# Patient Record
Sex: FEMALE | Race: WHITE | NOT HISPANIC OR LATINO | Employment: FULL TIME | URBAN - METROPOLITAN AREA
[De-identification: names, ages, dates, MRNs, and addresses within clinical notes are randomized per-mention and may not be internally consistent; named-entity substitution may affect disease eponyms.]

---

## 2021-04-12 ENCOUNTER — TELEPHONE (OUTPATIENT)
Dept: OBSTETRICS AND GYNECOLOGY | Facility: CLINIC | Age: 61
End: 2021-04-12

## 2021-04-19 RX ORDER — PHENTERMINE HYDROCHLORIDE 37.5 MG/1
37.5 TABLET ORAL DAILY
COMMUNITY
Start: 2021-03-16 | End: 2022-11-22

## 2021-04-19 RX ORDER — LOSARTAN POTASSIUM AND HYDROCHLOROTHIAZIDE 12.5; 5 MG/1; MG/1
0.5 TABLET ORAL DAILY
COMMUNITY
Start: 2021-04-01

## 2021-04-19 RX ORDER — METFORMIN HYDROCHLORIDE 500 MG/1
500 TABLET, EXTENDED RELEASE ORAL 2 TIMES DAILY
COMMUNITY
Start: 2021-03-16 | End: 2022-11-22

## 2021-04-19 RX ORDER — ESOMEPRAZOLE MAGNESIUM 40 MG/1
CAPSULE, DELAYED RELEASE ORAL
COMMUNITY
Start: 2021-04-01 | End: 2022-11-22

## 2021-04-20 ENCOUNTER — OFFICE VISIT (OUTPATIENT)
Dept: OBSTETRICS AND GYNECOLOGY | Facility: CLINIC | Age: 61
End: 2021-04-20
Payer: COMMERCIAL

## 2021-04-20 VITALS
SYSTOLIC BLOOD PRESSURE: 129 MMHG | DIASTOLIC BLOOD PRESSURE: 80 MMHG | HEIGHT: 64 IN | WEIGHT: 235.63 LBS | BODY MASS INDEX: 40.23 KG/M2 | HEART RATE: 70 BPM

## 2021-04-20 DIAGNOSIS — Z01.419 ENCOUNTER FOR WELL WOMAN EXAM WITH ROUTINE GYNECOLOGICAL EXAM: ICD-10-CM

## 2021-04-20 DIAGNOSIS — N95.1 MENOPAUSAL STATE: ICD-10-CM

## 2021-04-20 DIAGNOSIS — N95.2 ATROPHIC VAGINITIS: ICD-10-CM

## 2021-04-20 DIAGNOSIS — Z12.31 BREAST CANCER SCREENING BY MAMMOGRAM: Primary | ICD-10-CM

## 2021-04-20 PROCEDURE — 99396 PR PREVENTIVE VISIT,EST,40-64: ICD-10-PCS | Mod: S$GLB,,, | Performed by: OBSTETRICS & GYNECOLOGY

## 2021-04-20 PROCEDURE — 99396 PREV VISIT EST AGE 40-64: CPT | Mod: S$GLB,,, | Performed by: OBSTETRICS & GYNECOLOGY

## 2021-04-20 PROCEDURE — 1126F AMNT PAIN NOTED NONE PRSNT: CPT | Mod: S$GLB,,, | Performed by: OBSTETRICS & GYNECOLOGY

## 2021-04-20 PROCEDURE — 1126F PR PAIN SEVERITY QUANTIFIED, NO PAIN PRESENT: ICD-10-PCS | Mod: S$GLB,,, | Performed by: OBSTETRICS & GYNECOLOGY

## 2021-04-20 PROCEDURE — 3008F PR BODY MASS INDEX (BMI) DOCUMENTED: ICD-10-PCS | Mod: CPTII,S$GLB,, | Performed by: OBSTETRICS & GYNECOLOGY

## 2021-04-20 PROCEDURE — 3008F BODY MASS INDEX DOCD: CPT | Mod: CPTII,S$GLB,, | Performed by: OBSTETRICS & GYNECOLOGY

## 2021-04-27 ENCOUNTER — TELEPHONE (OUTPATIENT)
Dept: OBSTETRICS AND GYNECOLOGY | Facility: CLINIC | Age: 61
End: 2021-04-27

## 2022-01-18 ENCOUNTER — TELEPHONE (OUTPATIENT)
Dept: GASTROENTEROLOGY | Facility: CLINIC | Age: 62
End: 2022-01-18
Payer: COMMERCIAL

## 2022-01-18 NOTE — TELEPHONE ENCOUNTER
----- Message from Rebecca Rapp MA sent at 1/7/2022  8:02 AM CST -----  Regarding: OV    ----- Message -----  From: Ernestine Pacheco  Sent: 1/6/2022   2:58 PM CST  To: Luis Fernando [facility] Staff    pt needs call back to rescheduled missed appt..[phone] (home)

## 2022-01-18 NOTE — TELEPHONE ENCOUNTER
----- Message from Rebecca Rapp MA sent at 1/18/2022 10:10 AM CST -----    ----- Message -----  From: Clari Slaughter  Sent: 1/18/2022  10:08 AM CST  To: Luis Fernando MENDO[facility] Staff    Patient returning call  to [facility]. Call back number [phone] (home)

## 2022-01-19 ENCOUNTER — TELEPHONE (OUTPATIENT)
Dept: GASTROENTEROLOGY | Facility: CLINIC | Age: 62
End: 2022-01-19
Payer: COMMERCIAL

## 2022-01-19 NOTE — TELEPHONE ENCOUNTER
----- Message from Rebecca Rapp MA sent at 1/18/2022  1:53 PM CST -----    ----- Message -----  From: Clari Slaughter  Sent: 1/18/2022  11:56 AM CST  To: Luis Fernando MEND[facility] Staff     Patient calling back to speak to Eva . Call back number [phone]. Tks

## 2022-10-12 DIAGNOSIS — K21.9 GERD (GASTROESOPHAGEAL REFLUX DISEASE): Primary | ICD-10-CM

## 2022-10-17 ENCOUNTER — TELEPHONE (OUTPATIENT)
Dept: GASTROENTEROLOGY | Facility: CLINIC | Age: 62
End: 2022-10-17
Payer: COMMERCIAL

## 2022-10-17 NOTE — TELEPHONE ENCOUNTER
6/2021 colon with 2 TA, 1 cecal hyp, repeat in 3y.    Referral for GERD. Okay to make OV with [facility], next available.  [facility]

## 2022-11-21 RX ORDER — PRAZOSIN HYDROCHLORIDE 1 MG/1
CAPSULE ORAL
COMMUNITY
Start: 2022-10-06 | End: 2023-07-05

## 2022-11-21 RX ORDER — SUCRALFATE 1 G/1
TABLET ORAL
COMMUNITY
Start: 2022-10-07 | End: 2022-11-22

## 2022-11-21 RX ORDER — PANTOPRAZOLE SODIUM 40 MG/1
TABLET, DELAYED RELEASE ORAL DAILY
COMMUNITY
Start: 2022-10-06 | End: 2023-07-05 | Stop reason: SDUPTHER

## 2022-11-21 RX ORDER — SERTRALINE HYDROCHLORIDE 25 MG/1
TABLET, FILM COATED ORAL
COMMUNITY
Start: 2022-10-06 | End: 2023-07-05

## 2022-11-22 ENCOUNTER — OFFICE VISIT (OUTPATIENT)
Dept: GASTROENTEROLOGY | Facility: CLINIC | Age: 62
End: 2022-11-22
Payer: COMMERCIAL

## 2022-11-22 VITALS
WEIGHT: 245 LBS | DIASTOLIC BLOOD PRESSURE: 89 MMHG | BODY MASS INDEX: 41.83 KG/M2 | OXYGEN SATURATION: 95 % | HEART RATE: 68 BPM | SYSTOLIC BLOOD PRESSURE: 119 MMHG | HEIGHT: 64 IN

## 2022-11-22 DIAGNOSIS — Z86.010 HISTORY OF COLON POLYPS: ICD-10-CM

## 2022-11-22 DIAGNOSIS — R13.19 ESOPHAGEAL DYSPHAGIA: ICD-10-CM

## 2022-11-22 DIAGNOSIS — K21.9 GASTROESOPHAGEAL REFLUX DISEASE WITHOUT ESOPHAGITIS: Primary | ICD-10-CM

## 2022-11-22 DIAGNOSIS — R14.0 ABDOMINAL BLOATING: ICD-10-CM

## 2022-11-22 PROCEDURE — 1160F PR REVIEW ALL MEDS BY PRESCRIBER/CLIN PHARMACIST DOCUMENTED: ICD-10-PCS | Mod: CPTII,S$GLB,,

## 2022-11-22 PROCEDURE — 3074F PR MOST RECENT SYSTOLIC BLOOD PRESSURE < 130 MM HG: ICD-10-PCS | Mod: CPTII,S$GLB,,

## 2022-11-22 PROCEDURE — 99214 OFFICE O/P EST MOD 30 MIN: CPT | Mod: S$GLB,,,

## 2022-11-22 PROCEDURE — 3074F SYST BP LT 130 MM HG: CPT | Mod: CPTII,S$GLB,,

## 2022-11-22 PROCEDURE — 1159F MED LIST DOCD IN RCRD: CPT | Mod: CPTII,S$GLB,,

## 2022-11-22 PROCEDURE — 3008F PR BODY MASS INDEX (BMI) DOCUMENTED: ICD-10-PCS | Mod: CPTII,S$GLB,,

## 2022-11-22 PROCEDURE — 99214 PR OFFICE/OUTPT VISIT, EST, LEVL IV, 30-39 MIN: ICD-10-PCS | Mod: S$GLB,,,

## 2022-11-22 PROCEDURE — 1159F PR MEDICATION LIST DOCUMENTED IN MEDICAL RECORD: ICD-10-PCS | Mod: CPTII,S$GLB,,

## 2022-11-22 PROCEDURE — 3008F BODY MASS INDEX DOCD: CPT | Mod: CPTII,S$GLB,,

## 2022-11-22 PROCEDURE — 1160F RVW MEDS BY RX/DR IN RCRD: CPT | Mod: CPTII,S$GLB,,

## 2022-11-22 PROCEDURE — 3079F DIAST BP 80-89 MM HG: CPT | Mod: CPTII,S$GLB,,

## 2022-11-22 PROCEDURE — 3079F PR MOST RECENT DIASTOLIC BLOOD PRESSURE 80-89 MM HG: ICD-10-PCS | Mod: CPTII,S$GLB,,

## 2022-11-22 RX ORDER — SUCRALFATE 1 G/10ML
1 SUSPENSION ORAL
Qty: 414 ML | Refills: 0 | Status: SHIPPED | OUTPATIENT
Start: 2022-11-22 | End: 2023-05-29

## 2022-11-22 NOTE — PATIENT INSTRUCTIONS
Final Anesthesia Post-op Assessment    Patient: Mona Rosado  Procedure(s) Performed: HYSTEROSCOPY, DIAGNOSTICPOSSIBLE MYOMECTOMY, UTERUS, HYSTEROSCOPIC - POSSIBLE POLYPECTOMY, ENDOMETRIUM  Anesthesia type: General    Vitals Value Taken Time   Temp 36.7 °C (98 °F) 11/26/2019 11:35 AM   Pulse 68 11/26/2019 11:50 AM   Resp 10 11/26/2019 11:50 AM   SpO2 100 % 11/26/2019 11:50 AM   /63 11/26/2019 11:50 AM       Last 24 I/O:     Intake/Output Summary (Last 24 hours) at 11/26/2019 1159  Last data filed at 11/26/2019 1104  Gross per 24 hour   Intake 800 ml   Output --   Net 800 ml         Patient Location: Phase II  Post-op Vital Signs:stable  Level of Consciousness: participates in exam, answers questions appropriately, alert and awake  Respiratory Status: spontaneous ventilation  Cardiovascular blood pressure returned to baseline  Hydration: euvolemic  Pain Management: well controlled  Handoff: Handoff to receiving nurse was performed and questions were answered  Nausea: None  Airway Patency:patent  Post-op Assessment: awake, alert, appropriately conversant, or baseline, no complications, patient tolerated procedure well with no complications and evidence of recall       Schedule upper endoscopy with Dr. Gould.    Please notify my office if you have not been contacted within two weeks after any procedures, submitting any samples (biopsies, blood, stool, urine, etc.) or after any imaging (X-ray, CT, MRI, etc.).

## 2022-11-22 NOTE — PROGRESS NOTES
Clinic Note    Reason for visit:  The primary encounter diagnosis was Gastroesophageal reflux disease without esophagitis. Diagnoses of Esophageal dysphagia, Abdominal bloating, History of colon polyps, and BMI 40.0-44.9, adult were also pertinent to this visit.    PCP: Odalis Delarosa   [address] / [place] [address]    HPI:  This is a 62 y.o. female who is established with Dr. Gould. Patient reports intermittent esophageal dysphagia with solids/liquids for many years. Patient also reports chronic reflux. She was taking esomeprazole 40 mg daily and was having to supplement with OTC antacids as well so this was switched to pantoprazole 40 daily in 10/2022 and she states it seems to help. She was given Carafate tablets and she states it actually made her stomach feel worse but the liquid has helped her in the past. She states her son passed away earlier this year so that has caused a lot of stress. She denies blood in stool or dark stools. She denies NSAID use. She also reports intermittent abdominal bloating and wonders if she is intolerant to gluten.     6/2021 colon with 2 TA, 1 cecal hyp, repeat in 3y.    EGD/Colonoscopy with Dr. Dixon 4/18/2017: foreshortened esophagus, small HH, colon polyps- benign    Review of Systems   Constitutional:  Negative for chills, diaphoresis, fatigue, fever and unexpected weight change.   HENT:  Positive for postnasal drip and trouble swallowing. Negative for mouth sores, nosebleeds, sore throat and voice change.    Eyes:  Negative for pain, discharge and eye dryness.   Respiratory:  Positive for choking. Negative for apnea, cough, chest tightness, shortness of breath and wheezing.    Cardiovascular:  Positive for chest pain. Negative for palpitations, leg swelling and claudication.   Gastrointestinal:  Positive for abdominal distention and reflux. Negative for abdominal pain, anal bleeding, blood in stool, change in bowel habit, constipation, diarrhea,  nausea, rectal pain, vomiting, fecal incontinence and change in bowel habit.   Genitourinary:  Positive for bladder incontinence. Negative for difficulty urinating, dysuria, flank pain, frequency and hematuria.   Musculoskeletal:  Positive for joint swelling. Negative for arthralgias, back pain and joint deformity.   Integumentary:  Negative for color change, rash and wound.   Allergic/Immunologic: Negative for environmental allergies and food allergies.   Neurological:  Negative for seizures, facial asymmetry, speech difficulty, weakness, headaches and memory loss.   Hematological:  Negative for adenopathy. Does not bruise/bleed easily.   Psychiatric/Behavioral:  Negative for agitation, behavioral problems, confusion, hallucinations and sleep disturbance.       Past Medical History:   Diagnosis Date    BMI 40.0-44.9, adult     Colon polyp     Elevated glucose     GERD (gastroesophageal reflux disease)     HTN (hypertension)     LULA (stress urinary incontinence, female)      Past Surgical History:   Procedure Laterality Date     SECTION       SECTION       SECTION      CHOLECYSTECTOMY      COLONOSCOPY      ENDOSCOPY N/A     HYSTERECTOMY      for fibroids and one ovary removed for cysts    TONSILLECTOMY AND ADENOIDECTOMY      TOTAL KNEE ARTHROPLASTY Right      Family History   Problem Relation Age of Onset    Pancreatic cancer Mother 68    Coronary artery disease Father     Endometrial cancer Sister 52    Breast cancer Maternal Aunt     Esophageal cancer Paternal Uncle     Stomach cancer Maternal Grandmother     Liver cancer Neg Hx     Liver disease Neg Hx     Colon cancer Neg Hx     Crohn's disease Neg Hx     Ulcerative colitis Neg Hx      Social History     Tobacco Use    Smoking status: Never    Smokeless tobacco: Never   Substance Use Topics    Alcohol use: Never    Drug use: Never     Review of patient's allergies indicates:  No Known Allergies   Medication List with Changes/Refills  "  New Medications    SUCRALFATE (CARAFATE) 100 MG/ML SUSPENSION    Take 10 mLs (1 g total) by mouth 4 (four) times daily before meals and nightly.   Current Medications    LOSARTAN-HYDROCHLOROTHIAZIDE 50-12.5 MG (HYZAAR) 50-12.5 MG PER TABLET    Take 0.5 tablets by mouth once daily.    PANTOPRAZOLE (PROTONIX) 40 MG TABLET    once daily.    PRASTERONE, DHEA, (INTRAROSA) 6.5 MG INST    Place 6.5 mg vaginally every evening.    PRAZOSIN (MINIPRESS) 1 MG CAP        SERTRALINE (ZOLOFT) 25 MG TABLET       Discontinued Medications    ESOMEPRAZOLE (NEXIUM) 40 MG CAPSULE        METFORMIN (GLUCOPHAGE-XR) 500 MG ER 24HR TABLET    Take 500 mg by mouth 2 (two) times daily.    PHENTERMINE (ADIPEX-P) 37.5 MG TABLET    Take 37.5 mg by mouth once daily.    SUCRALFATE (CARAFATE) 1 GRAM TABLET             Vital Signs:  /89   Pulse 68   Ht 5' 4" (1.626 m)   Wt 111.1 kg (245 lb)   SpO2 95%   BMI 42.05 kg/m²        Physical Exam  Vitals reviewed.   Constitutional:       General: She is awake. She is not in acute distress.     Appearance: Normal appearance. She is well-developed. She is obese. She is not ill-appearing, toxic-appearing or diaphoretic.   HENT:      Head: Normocephalic and atraumatic.      Nose: Nose normal.      Mouth/Throat:      Mouth: Mucous membranes are moist.      Pharynx: Oropharynx is clear. No oropharyngeal exudate or posterior oropharyngeal erythema.   Eyes:      General: Lids are normal. Gaze aligned appropriately. No scleral icterus.        Right eye: No discharge.         Left eye: No discharge.      Extraocular Movements: Extraocular movements intact.      Conjunctiva/sclera: Conjunctivae normal.   Neck:      Trachea: Trachea normal.   Cardiovascular:      Rate and Rhythm: Normal rate and regular rhythm.      Pulses:           Radial pulses are 2+ on the right side and 2+ on the left side.   Pulmonary:      Effort: Pulmonary effort is normal. No respiratory distress.      Breath sounds: Normal breath " sounds. No stridor. No wheezing or rhonchi.   Chest:      Chest wall: No tenderness.   Abdominal:      General: Bowel sounds are normal. There is no distension.      Palpations: Abdomen is soft. There is no fluid wave, hepatomegaly or mass.      Tenderness: There is no abdominal tenderness. There is no guarding or rebound.   Musculoskeletal:         General: No tenderness or deformity.      Cervical back: Full passive range of motion without pain and neck supple. No tenderness.      Right lower leg: No edema.      Left lower leg: No edema.   Lymphadenopathy:      Cervical: No cervical adenopathy.   Skin:     General: Skin is warm and dry.      Capillary Refill: Capillary refill takes less than 2 seconds.      Coloration: Skin is not cyanotic, jaundiced or pale.      Findings: No rash.   Neurological:      General: No focal deficit present.      Mental Status: She is alert and oriented to person, place, and time.      Cranial Nerves: No facial asymmetry.      Motor: No tremor.   Psychiatric:         Attention and Perception: Attention normal.         Mood and Affect: Mood and affect normal.         Speech: Speech normal.         Behavior: Behavior normal. Behavior is cooperative.          All of the data above and below has been reviewed by myself and any further interpretations will be reflected in the assessment and plan.   The data includes review of external notes, and independent interpretation of lab results, procedures, x-rays, and imaging reports.      Assessment:  Gastroesophageal reflux disease without esophagitis  -     Ambulatory Referral to External Surgery  -     Ambulatory referral/consult to Gastroenterology  -     sucralfate (CARAFATE) 100 mg/mL suspension; Take 10 mLs (1 g total) by mouth 4 (four) times daily before meals and nightly.  Dispense: 414 mL; Refill: 0    Esophageal dysphagia  -     Ambulatory Referral to External Surgery    Abdominal bloating    History of colon polyps  Comments:  Due  6/2024    BMI 40.0-44.9, adult    GERD v dysmotility v EE v H. Pylori v ulcer v Celiac. Plan for EGD with E/G/D biopsies.   Consider EM if EGD unrevealing as source of dysphagia. Discussed with patient.      Recommendations:  Schedule upper endoscopy with Dr. Gould at [facility].   May take Carafate as needed.   Continue taking pantoprazole 40 mg daily.     Risks, benefits, and alternatives of medical management, any associated procedures, and/or treatment discussed with the patient. Patient given opportunity to ask questions and voices understanding. Patient has elected to proceed with the recommended care modalities as discussed.    Follow up with Dr. Gould.     Order summary:  Orders Placed This Encounter   Procedures    Ambulatory Referral to External Surgery          Instructed patient to notify my office if they have not been contacted within two weeks after any procedures, submitting any samples (biopsies, blood, stool, urine, etc.) or after any imaging (X-ray, CT, MRI, etc.).      Mita Matos NP    This document may have been created using a voice recognition transcribing system. Incorrect words or phrases may have been missed during proofreading. Please interpret accordingly or contact me for clarification.

## 2022-11-28 ENCOUNTER — TELEPHONE (OUTPATIENT)
Dept: GASTROENTEROLOGY | Facility: CLINIC | Age: 62
End: 2022-11-28
Payer: COMMERCIAL

## 2022-11-28 DIAGNOSIS — R13.19 ESOPHAGEAL DYSPHAGIA: ICD-10-CM

## 2022-11-28 DIAGNOSIS — K21.9 GASTROESOPHAGEAL REFLUX DISEASE WITHOUT ESOPHAGITIS: Primary | ICD-10-CM

## 2022-11-28 NOTE — TELEPHONE ENCOUNTER
"[facility]  [address] [place] [address]  Phone: [phone]  Fax: [phone]    History & Physical         Provider: Dr. Maricruz Gould    Patient Name: Tammi VARGAS (age):1960  62 y.o.           Gender: female   Phone: [phone]     Referring Physician: Odalis Delarosa     Vital Signs:   Height - 5' 4"  Weight - 245 lbs  BMI -  42.02    Plan: EGD    Encounter Diagnoses   Name Primary?    Gastroesophageal reflux disease without esophagitis Yes    Esophageal dysphagia            History:      Past Medical History:   Diagnosis Date    BMI 40.0-44.9, adult     Colon polyp     Elevated glucose     GERD (gastroesophageal reflux disease)     HTN (hypertension)     LULA (stress urinary incontinence, female)       Past Surgical History:   Procedure Laterality Date     SECTION       SECTION       SECTION      CHOLECYSTECTOMY      COLONOSCOPY      ENDOSCOPY N/A     HYSTERECTOMY      for fibroids and one ovary removed for cysts    TONSILLECTOMY AND ADENOIDECTOMY      TOTAL KNEE ARTHROPLASTY Right       Medication List with Changes/Refills   Current Medications    LOSARTAN-HYDROCHLOROTHIAZIDE 50-12.5 MG (HYZAAR) 50-12.5 MG PER TABLET    Take 0.5 tablets by mouth once daily.    PANTOPRAZOLE (PROTONIX) 40 MG TABLET    once daily.    PRASTERONE, DHEA, (INTRAROSA) 6.5 MG INST    Place 6.5 mg vaginally every evening.    PRAZOSIN (MINIPRESS) 1 MG CAP        SERTRALINE (ZOLOFT) 25 MG TABLET        SUCRALFATE (CARAFATE) 100 MG/ML SUSPENSION    Take 10 mLs (1 g total) by mouth 4 (four) times daily before meals and nightly.      Review of patient's allergies indicates:  No Known Allergies   Family History   Problem Relation Age of Onset    Pancreatic cancer Mother 68    Coronary artery disease Father     Endometrial cancer Sister 52    Breast cancer Maternal Aunt     Esophageal " cancer Paternal Uncle     Stomach cancer Maternal Grandmother     Liver cancer Neg Hx     Liver disease Neg Hx     Colon cancer Neg Hx     Crohn's disease Neg Hx     Ulcerative colitis Neg Hx       Social History     Tobacco Use    Smoking status: Never    Smokeless tobacco: Never   Substance Use Topics    Alcohol use: Never    Drug use: Never        Physical Examination:     General Appearance:___________________________  HEENT: _____________________________________  Abdomen:____________________________________  Heart:________________________________________  Lungs:_______________________________________  Extremities:___________________________________  Skin:_________________________________________  Endocrine:____________________________________  Genitourinary:_________________________________  Neurological:__________________________________      Patient has been evaluated immediately prior to sedation and is medically cleared for endoscopy with IVCS as an ASA class: ______      Physician Signature: _________________________       Date: ________  Time: ________

## 2023-07-03 NOTE — PROGRESS NOTES
Clinic Note    Reason for visit:  The primary encounter diagnosis was Esophageal dysphagia. Diagnoses of Gastroesophageal reflux disease without esophagitis, History of colon polyps, and Generalized abdominal pain were also pertinent to this visit.    PCP: Odalis Delarosa       HPI:  This is a 63 y.o. female here for follow up of GERD, dysphagia. EGD ordered last visit- pt cancelled.  She was on Nexium in about a year and a half ago transitioned to pantoprazole 40 mg daily.  Without it she will get near daily symptoms of her intermittent dysphagia to solids and liquids.  With it she gets symptoms about once or twice a week.  She has to eat her last meal before 1900.  Otherwise she may have to sleep in a recliner.  She had a prior upper endoscopy but no known prior esophageal biopsies.  General abdominal pain at times, cramping, not related to eating or BM. Seems more lower. Asks about AAA eval.    6/2021 colon with 2 TA, 1 cecal hyp, repeat in 3y.    EGD/Colonoscopy with Dr. Dixon 4/18/2017: foreshortened esophagus, small HH, colon polyps- benign    GERD v dysmotility v EE v H. Pylori v ulcer v Celiac. Plan for EGD with E/G/D biopsies.   Consider EM if EGD unrevealing as source of dysphagia. Discussed with patient.      Review of Systems   Constitutional:  Negative for fatigue, fever and unexpected weight change.   HENT:  Positive for postnasal drip. Negative for mouth sores, sore throat and trouble swallowing.    Eyes:  Negative for pain, discharge and eye dryness.   Respiratory:  Negative for apnea, cough, choking, chest tightness, shortness of breath and wheezing.    Cardiovascular:  Negative for chest pain, palpitations and leg swelling.   Gastrointestinal:  Positive for nausea and reflux. Negative for abdominal distention, abdominal pain, anal bleeding, blood in stool, change in bowel habit, constipation, diarrhea, rectal pain, vomiting, fecal incontinence and change in bowel habit.   Genitourinary:   Positive for bladder incontinence. Negative for dysuria and hematuria.   Musculoskeletal:  Positive for joint swelling. Negative for arthralgias and back pain.   Integumentary:  Negative for color change and rash.   Allergic/Immunologic: Negative for environmental allergies and food allergies.   Neurological:  Negative for seizures and headaches.   Hematological:  Negative for adenopathy. Does not bruise/bleed easily.      Past Medical History:   Diagnosis Date    BMI 40.0-44.9, adult     Colon polyp     Elevated glucose     GERD (gastroesophageal reflux disease)     HTN (hypertension)     LULA (stress urinary incontinence, female)      Past Surgical History:   Procedure Laterality Date     SECTION       SECTION       SECTION      CHOLECYSTECTOMY      COLONOSCOPY      ENDOSCOPY N/A     HYSTERECTOMY      for fibroids and one ovary removed for cysts    TONSILLECTOMY AND ADENOIDECTOMY      TOTAL KNEE ARTHROPLASTY Right      Family History   Problem Relation Age of Onset    Pancreatic cancer Mother 68    Coronary artery disease Father     Endometrial cancer Sister 52    Breast cancer Maternal Aunt     Esophageal cancer Paternal Uncle     Stomach cancer Maternal Grandmother     Liver cancer Neg Hx     Liver disease Neg Hx     Colon cancer Neg Hx     Crohn's disease Neg Hx     Ulcerative colitis Neg Hx      Social History     Tobacco Use    Smoking status: Never    Smokeless tobacco: Never   Substance Use Topics    Alcohol use: Never    Drug use: Never     Review of patient's allergies indicates:  No Known Allergies     Medication List with Changes/Refills   New Medications    SUCRALFATE (CARAFATE) 1 GRAM TABLET    Take 1 tablet (1 g total) by mouth 4 (four) times daily.   Current Medications    LOSARTAN-HYDROCHLOROTHIAZIDE 50-12.5 MG (HYZAAR) 50-12.5 MG PER TABLET    Take 0.5 tablets by mouth once daily.   Changed and/or Refilled Medications    Modified Medication Previous Medication     "PANTOPRAZOLE (PROTONIX) 40 MG TABLET pantoprazole (PROTONIX) 40 MG tablet       Take 1 tablet (40 mg total) by mouth once daily.    once daily.   Discontinued Medications    PRASTERONE, DHEA, (INTRAROSA) 6.5 MG INST    Place 6.5 mg vaginally every evening.    PRAZOSIN (MINIPRESS) 1 MG CAP        SERTRALINE (ZOLOFT) 25 MG TABLET        SUCRALFATE (CARAFATE) 100 MG/ML SUSPENSION    TAKE 10 MLS BY MOUTH 4 TIMES DAILY BEFORE MEALS AND NIGHTLY.         Vital Signs:  /74   Pulse 75   Ht 5' 5" (1.651 m)   Wt 101.6 kg (224 lb)   SpO2 (!) 94%   BMI 37.28 kg/m²         Physical Exam  Vitals reviewed.   Constitutional:       General: She is awake. She is not in acute distress.     Appearance: Normal appearance. She is well-developed. She is not ill-appearing, toxic-appearing or diaphoretic.   HENT:      Head: Normocephalic and atraumatic.      Nose: Nose normal.      Mouth/Throat:      Mouth: Mucous membranes are moist.      Pharynx: Oropharynx is clear. No oropharyngeal exudate or posterior oropharyngeal erythema.   Eyes:      General: Lids are normal. Gaze aligned appropriately. No scleral icterus.        Right eye: No discharge.         Left eye: No discharge.      Conjunctiva/sclera: Conjunctivae normal.   Neck:      Trachea: Trachea normal.   Cardiovascular:      Rate and Rhythm: Normal rate and regular rhythm.      Pulses:           Radial pulses are 2+ on the right side and 2+ on the left side.   Pulmonary:      Effort: Pulmonary effort is normal. No respiratory distress.      Breath sounds: No stridor. No wheezing.   Chest:      Chest wall: No tenderness.   Abdominal:      General: Bowel sounds are normal. There is no distension.      Palpations: Abdomen is soft. There is no fluid wave, hepatomegaly or mass.      Tenderness: There is no abdominal tenderness. There is no guarding or rebound.   Musculoskeletal:         General: No tenderness or deformity.      Cervical back: Full passive range of motion " without pain and neck supple. No tenderness.      Right lower leg: No edema.      Left lower leg: No edema.   Lymphadenopathy:      Cervical: No cervical adenopathy.   Skin:     General: Skin is warm and dry.      Capillary Refill: Capillary refill takes less than 2 seconds.      Coloration: Skin is not cyanotic, jaundiced or pale.   Neurological:      General: No focal deficit present.      Mental Status: She is alert and oriented to person, place, and time.      Motor: No tremor.   Psychiatric:         Attention and Perception: Attention normal.         Mood and Affect: Mood and affect normal.         Speech: Speech normal.         Behavior: Behavior normal. Behavior is cooperative.          All of the data above and below has been reviewed by myself and any further interpretations will be reflected in the assessment and plan.   The data includes review of external notes, and independent interpretation of lab results, procedures, x-rays, and imaging reports.      Assessment:  Esophageal dysphagia  -     sucralfate (CARAFATE) 1 gram tablet; Take 1 tablet (1 g total) by mouth 4 (four) times daily.  Dispense: 300 tablet; Refill: 1  -     pantoprazole (PROTONIX) 40 MG tablet; Take 1 tablet (40 mg total) by mouth once daily.  Dispense: 90 tablet; Refill: 4    Gastroesophageal reflux disease without esophagitis  -     sucralfate (CARAFATE) 1 gram tablet; Take 1 tablet (1 g total) by mouth 4 (four) times daily.  Dispense: 300 tablet; Refill: 1  -     pantoprazole (PROTONIX) 40 MG tablet; Take 1 tablet (40 mg total) by mouth once daily.  Dispense: 90 tablet; Refill: 4    History of colon polyps    Generalized abdominal pain  -     US Abdomen Complete; Future; Expected date: 07/05/2023    Dysphagia seems directly related to her acid reflux episodes.  She already practices therapeutic lifestyle changes.  She is on the pantoprazole 40 mg daily with improvement but not complete control of her symptoms.  Plan for upper  endoscopy with EGD biopsies.  If this is unrevealing then recommend esophageal manometry (reviewed with the patient in clinic).  The liquid Carafate works better for her than the tablets.  She requests a refill.     Recommendations:  Schedule upper endoscopy.    Schedule abdominal ultrasound.    You may continue your pantoprazole 40 mg daily.  May take Carafate as needed.  May dissolve a Carafate tablet in 2 tsp of water if you prefer the liquid version.    Please notify my office if you have not been contacted within two weeks after any procedures, submitting any samples (biopsies, blood, stool, urine, etc.) or after any imaging (X-ray, CT, MRI, etc.).     Risks, benefits, and alternatives of medical management, any associated procedures, and/or treatment discussed with the patient. Patient given opportunity to ask questions and voices understanding. Patient has elected to proceed with the recommended care modalities as discussed.    Instructed patient to notify my office if they have not been contacted within two weeks after any procedures, submitting any samples (biopsies, blood, stool, urine, etc.) or after any imaging (X-ray, CT, MRI, etc.).     Follow up in about 1 year (around 7/5/2024).    Order summary:  Orders Placed This Encounter   Procedures    US Abdomen Complete          This document may have been created using a voice recognition transcribing system. Incorrect words or phrases may have been missed during proofreading. Please interpret accordingly or contact me for clarification.     Maricruz Gould MD

## 2023-07-05 ENCOUNTER — OFFICE VISIT (OUTPATIENT)
Dept: GASTROENTEROLOGY | Facility: CLINIC | Age: 63
End: 2023-07-05
Payer: MEDICARE

## 2023-07-05 ENCOUNTER — TELEPHONE (OUTPATIENT)
Dept: GASTROENTEROLOGY | Facility: CLINIC | Age: 63
End: 2023-07-05

## 2023-07-05 VITALS
HEIGHT: 65 IN | HEART RATE: 75 BPM | BODY MASS INDEX: 37.32 KG/M2 | WEIGHT: 224 LBS | SYSTOLIC BLOOD PRESSURE: 103 MMHG | OXYGEN SATURATION: 94 % | DIASTOLIC BLOOD PRESSURE: 74 MMHG

## 2023-07-05 DIAGNOSIS — Z86.010 HISTORY OF COLON POLYPS: ICD-10-CM

## 2023-07-05 DIAGNOSIS — R10.84 GENERALIZED ABDOMINAL PAIN: ICD-10-CM

## 2023-07-05 DIAGNOSIS — K21.9 GASTROESOPHAGEAL REFLUX DISEASE WITHOUT ESOPHAGITIS: ICD-10-CM

## 2023-07-05 DIAGNOSIS — R13.19 ESOPHAGEAL DYSPHAGIA: Primary | ICD-10-CM

## 2023-07-05 PROCEDURE — 99215 OFFICE O/P EST HI 40 MIN: CPT | Mod: S$GLB,,, | Performed by: INTERNAL MEDICINE

## 2023-07-05 PROCEDURE — 99215 PR OFFICE/OUTPT VISIT, EST, LEVL V, 40-54 MIN: ICD-10-PCS | Mod: S$GLB,,, | Performed by: INTERNAL MEDICINE

## 2023-07-05 RX ORDER — SUCRALFATE 1 G/1
1 TABLET ORAL 4 TIMES DAILY
Qty: 300 TABLET | Refills: 1 | Status: SHIPPED | OUTPATIENT
Start: 2023-07-05 | End: 2023-12-02

## 2023-07-05 RX ORDER — PANTOPRAZOLE SODIUM 40 MG/1
40 TABLET, DELAYED RELEASE ORAL DAILY
Qty: 90 TABLET | Refills: 4 | Status: SHIPPED | OUTPATIENT
Start: 2023-07-05

## 2023-07-05 NOTE — LETTER
July 5, 2023        Odalis Delarosa MD  [address]  [address]  [place] [address]             [place] [facility]  [address] [address]  Phone: [phone]  Fax: [phone]   Patient: Tammi Horton   MR Number: [id number]   YOB: 1960   Date of Visit: 7/5/2023       Dear Dr. Delarosa:    Thank you for referring Tammi Horton to me for evaluation. Attached you will find relevant portions of my assessment and plan of care.    If you have questions, please do not hesitate to call me. I look forward to following Tammi Horton along with you.    Sincerely,      Maricruz Gould MD            CC  No Recipients    Enclosure

## 2023-07-05 NOTE — PATIENT INSTRUCTIONS
Schedule upper endoscopy.    Schedule abdominal ultrasound.    You may continue your pantoprazole 40 mg daily.  May take Carafate as needed.  May dissolve a Carafate tablet in 2 tsp of water if you prefer the liquid version.    Please notify my office if you have not been contacted within two weeks after any procedures, submitting any samples (biopsies, blood, stool, urine, etc.) or after any imaging (X-ray, CT, MRI, etc.).

## 2023-07-17 ENCOUNTER — TELEPHONE (OUTPATIENT)
Dept: GASTROENTEROLOGY | Facility: CLINIC | Age: 63
End: 2023-07-17
Payer: MEDICARE

## 2023-07-18 NOTE — TELEPHONE ENCOUNTER
Abd US: liver size ULN, fatty liver, PV patent, no GB, no manuel dil.    Notify patient that her abdominal US looks well. Some signs of fatty liver. Send copy or report to PCP as JOVITA HUI

## 2023-08-24 ENCOUNTER — TELEPHONE (OUTPATIENT)
Dept: GASTROENTEROLOGY | Facility: CLINIC | Age: 63
End: 2023-08-24
Payer: MEDICARE

## 2023-08-24 VITALS — WEIGHT: 224 LBS | HEIGHT: 65 IN | BODY MASS INDEX: 37.32 KG/M2

## 2023-08-24 DIAGNOSIS — K21.9 GASTROESOPHAGEAL REFLUX DISEASE WITHOUT ESOPHAGITIS: Primary | ICD-10-CM

## 2023-08-24 DIAGNOSIS — R13.19 ESOPHAGEAL DYSPHAGIA: ICD-10-CM

## 2023-08-24 NOTE — TELEPHONE ENCOUNTER
"[place] [facility]  [address] [place] [address]  Phone: [phone]  Fax: [phone]    History & Physical         Provider: Dr. Maricruz Gould    Patient Name: Tammi PRESSLEY Statum                                                                       (age):1960  63 y.o.           Gender: female   Phone: [phone]     Referring Physician: Odalis Delarosa     Vital Signs:   Height - 5 ' 5"  Weight - 224 lb  BMI -  37.28    Plan: EGD @ [facility] [place]    Encounter Diagnoses   Name Primary?    Gastroesophageal reflux disease without esophagitis Yes    Esophageal dysphagia            History:      Past Medical History:   Diagnosis Date    BMI 40.0-44.9, adult     Colon polyp     Elevated glucose     GERD (gastroesophageal reflux disease)     HTN (hypertension)     LULA (stress urinary incontinence, female)       Past Surgical History:   Procedure Laterality Date     SECTION       SECTION       SECTION      CHOLECYSTECTOMY      COLONOSCOPY      ENDOSCOPY N/A     HYSTERECTOMY      for fibroids and one ovary removed for cysts    TONSILLECTOMY AND ADENOIDECTOMY      TOTAL KNEE ARTHROPLASTY Right       Medication List with Changes/Refills   Current Medications    LOSARTAN-HYDROCHLOROTHIAZIDE 50-12.5 MG (HYZAAR) 50-12.5 MG PER TABLET    Take 0.5 tablets by mouth once daily.    PANTOPRAZOLE (PROTONIX) 40 MG TABLET    Take 1 tablet (40 mg total) by mouth once daily.    SUCRALFATE (CARAFATE) 1 GRAM TABLET    Take 1 tablet (1 g total) by mouth 4 (four) times daily.      Review of patient's allergies indicates:  No Known Allergies   Family History   Problem Relation Age of Onset    Pancreatic cancer Mother 68    Coronary artery disease Father     Endometrial cancer Sister 52    Breast cancer Maternal Aunt     Esophageal cancer Paternal Uncle     Stomach cancer Maternal Grandmother     Liver cancer Neg Hx     Liver disease Neg Hx     Colon cancer Neg Hx     Crohn's " disease Neg Hx     Ulcerative colitis Neg Hx       Social History     Tobacco Use    Smoking status: Never    Smokeless tobacco: Never   Substance Use Topics    Alcohol use: Never    Drug use: Never        Physical Examination:     General Appearance:___________________________  HEENT: _____________________________________  Abdomen:____________________________________  Heart:________________________________________  Lungs:_______________________________________  Extremities:___________________________________  Skin:_________________________________________  Endocrine:____________________________________  Genitourinary:_________________________________  Neurological:__________________________________      Patient has been evaluated immediately prior to sedation and is medically cleared for endoscopy with IVCS as an ASA class: ______      Physician Signature: _________________________       Date: ________  Time: ________

## 2023-08-30 ENCOUNTER — OUTSIDE PLACE OF SERVICE (OUTPATIENT)
Dept: GASTROENTEROLOGY | Facility: CLINIC | Age: 63
End: 2023-08-30

## 2023-08-30 PROCEDURE — 43239 EGD BIOPSY SINGLE/MULTIPLE: CPT | Mod: ,,, | Performed by: INTERNAL MEDICINE

## 2023-08-30 PROCEDURE — 43239 PR EGD, FLEX, W/BIOPSY, SGL/MULTI: ICD-10-PCS | Mod: ,,, | Performed by: INTERNAL MEDICINE

## 2023-09-05 ENCOUNTER — TELEPHONE (OUTPATIENT)
Dept: GASTROENTEROLOGY | Facility: CLINIC | Age: 63
End: 2023-09-05
Payer: MEDICARE

## 2024-07-08 NOTE — PROGRESS NOTES
Clinic Note    Reason for visit:  The primary encounter diagnosis was Gastroesophageal reflux disease without esophagitis. Diagnoses of Epigastric pain, Esophageal dysphagia, Family history of pancreatic cancer, and History of colon polyps were also pertinent to this visit.    PCP: Odalis Delarosa       HPI:  This is a 64 y.o. female here for follow up. Patient with GERD, dysphagia. On panto 40mg daily. She states she she still will have episodes of reflux/heartburn with inciting foods and eating late. She states she has upper abdominal pain after meals. Describes as an ache. Pain improves with Carafate. She reports intermittent dysphagia mainly with pills. May occur twice a month. She is having constipation. Having BM every 3 days. May have loose stool when she does have BM. Has seen [facility] w/ BM attributed to hemorrhoids.  Mother had pancreatic cancer. Interested in pancreatic cancer evaluation.    EGD 8/31/2023 DBx nl, GBx react w/o Hp, EGBx reflux, EBx reflux.     Abd US 4/23/2024: liver size ULN, fatty liver, PV patent, no GB, no manuel dil.     6/2021 colon with 2 TA, 1 cecal hyp, repeat in 3y.     Review of Systems   Constitutional:  Negative for fatigue, fever and unexpected weight change.   HENT:  Negative for mouth sores, postnasal drip, sore throat and trouble swallowing.    Eyes:  Negative for pain, discharge and eye dryness.   Respiratory:  Negative for apnea, cough, choking, chest tightness, shortness of breath and wheezing.    Cardiovascular:  Negative for chest pain, palpitations and leg swelling.   Gastrointestinal:  Positive for abdominal pain, anal bleeding, constipation, diarrhea, nausea and reflux. Negative for abdominal distention, blood in stool, change in bowel habit, rectal pain, vomiting and fecal incontinence.   Genitourinary:  Positive for bladder incontinence and hematuria. Negative for dysuria.   Musculoskeletal:  Negative for arthralgias, back pain and joint swelling.   Integumentary:   Negative for color change and rash.   Allergic/Immunologic: Negative for environmental allergies and food allergies.   Neurological:  Negative for seizures and headaches.   Hematological:  Negative for adenopathy. Does not bruise/bleed easily.        Past Medical History:   Diagnosis Date    BMI 40.0-44.9, adult     Colon polyp     Elevated glucose     GERD (gastroesophageal reflux disease)     HTN (hypertension)     LULA (stress urinary incontinence, female)      Past Surgical History:   Procedure Laterality Date     SECTION       SECTION       SECTION      CHOLECYSTECTOMY      COLONOSCOPY      ENDOSCOPY N/A     HYSTERECTOMY      for fibroids and one ovary removed for cysts    TONSILLECTOMY AND ADENOIDECTOMY      TOTAL KNEE ARTHROPLASTY Right      Family History   Problem Relation Name Age of Onset    Pancreatic cancer Mother  68    Coronary artery disease Father      Endometrial cancer Sister  52    Breast cancer Maternal Aunt      Esophageal cancer Paternal Uncle      Stomach cancer Maternal Grandmother      Liver cancer Neg Hx      Liver disease Neg Hx      Colon cancer Neg Hx      Crohn's disease Neg Hx      Ulcerative colitis Neg Hx       Social History     Tobacco Use    Smoking status: Never    Smokeless tobacco: Never   Substance Use Topics    Alcohol use: Never    Drug use: Never     Review of patient's allergies indicates:  No Known Allergies     Medication List with Changes/Refills   New Medications    SOD SULF-POT CHLORIDE-MAG SULF (SUTAB) 1.479-0.188- 0.225 GRAM TABLET    Take according to package instructions with indicated amount of water. No breakfast day before test. May substitute with Suprep, Clenpiq, Plenvu, Moviprep or GoLytely based on Rx plan and patient preference.   Current Medications    LOSARTAN-HYDROCHLOROTHIAZIDE 50-12.5 MG (HYZAAR) 50-12.5 MG PER TABLET    Take 1 tablet by mouth once daily.   Changed and/or Refilled Medications    Modified Medication Previous  "Medication    PANTOPRAZOLE (PROTONIX) 40 MG TABLET pantoprazole (PROTONIX) 40 MG tablet       Take 1 tablet (40 mg total) by mouth once daily.    Take 1 tablet (40 mg total) by mouth once daily.    SUCRALFATE (CARAFATE) 1 GRAM TABLET sucralfate (CARAFATE) 1 gram tablet       Take 1 tablet (1 g total) by mouth 4 (four) times daily.    Take 1 g by mouth 4 (four) times daily.         Vital Signs:  BP 96/72 (BP Location: Left arm, Patient Position: Sitting)   Pulse 92   Temp 98.1 °F (36.7 °C) (Oral)   Resp 18   Ht 5' 4.5" (1.638 m)   Wt 108.9 kg (240 lb)   SpO2 (!) 94%   BMI 40.56 kg/m²         Physical Exam  Vitals reviewed.   Constitutional:       General: She is awake. She is not in acute distress.     Appearance: Normal appearance. She is well-developed. She is not ill-appearing, toxic-appearing or diaphoretic.   HENT:      Head: Normocephalic and atraumatic.      Nose: Nose normal.      Mouth/Throat:      Mouth: Mucous membranes are moist.      Pharynx: Oropharynx is clear. No oropharyngeal exudate or posterior oropharyngeal erythema.   Eyes:      General: Lids are normal. Gaze aligned appropriately. No scleral icterus.        Right eye: No discharge.         Left eye: No discharge.      Conjunctiva/sclera: Conjunctivae normal.   Neck:      Trachea: Trachea normal.   Cardiovascular:      Rate and Rhythm: Normal rate and regular rhythm.      Pulses:           Radial pulses are 2+ on the right side and 2+ on the left side.   Pulmonary:      Effort: Pulmonary effort is normal. No respiratory distress.      Breath sounds: No stridor. No wheezing.   Chest:      Chest wall: No tenderness.   Abdominal:      General: Bowel sounds are normal. There is no distension.      Palpations: Abdomen is soft. There is no fluid wave, hepatomegaly or mass.      Tenderness: There is no abdominal tenderness. There is no guarding or rebound.   Musculoskeletal:         General: No tenderness or deformity.      Cervical back: Full " passive range of motion without pain and neck supple. No tenderness.      Right lower leg: No edema.      Left lower leg: No edema.   Lymphadenopathy:      Cervical: No cervical adenopathy.   Skin:     General: Skin is warm and dry.      Capillary Refill: Capillary refill takes less than 2 seconds.      Coloration: Skin is not cyanotic, jaundiced or pale.   Neurological:      General: No focal deficit present.      Mental Status: She is alert and oriented to person, place, and time.      Motor: No tremor.   Psychiatric:         Attention and Perception: Attention normal.         Mood and Affect: Mood and affect normal.         Speech: Speech normal.         Behavior: Behavior normal. Behavior is cooperative.            All of the data above and below has been reviewed by myself and any further interpretations will be reflected in the assessment and plan.   The data includes review of external notes, and independent interpretation of lab results, procedures, x-rays, and imaging reports.      Assessment:  Gastroesophageal reflux disease without esophagitis  -     pantoprazole (PROTONIX) 40 MG tablet; Take 1 tablet (40 mg total) by mouth once daily.  Dispense: 90 tablet; Refill: 4    Epigastric pain  -     sucralfate (CARAFATE) 1 gram tablet; Take 1 tablet (1 g total) by mouth 4 (four) times daily.  Dispense: 120 tablet; Refill: 0  -     MRI Abdomen W WO Contrast; Future; Expected date: 07/09/2024    Esophageal dysphagia  -     pantoprazole (PROTONIX) 40 MG tablet; Take 1 tablet (40 mg total) by mouth once daily.  Dispense: 90 tablet; Refill: 4    Family history of pancreatic cancer    History of colon polyps  -     Ambulatory Referral to External Surgery  -     sod sulf-pot chloride-mag sulf (SUTAB) 1.479-0.188- 0.225 gram tablet; Take according to package instructions with indicated amount of water. No breakfast day before test. May substitute with Suprep, Clenpiq, Plenvu, Moviprep or GoLytely based on Rx plan and  patient preference.  Dispense: 24 tablet; Refill: 0      Dysphagia. EGD-normal. Patient states dysphagia EM. Defer EM  GERD- on panto 40mg daily.  Upper abdominal pain- improved with Carafate. Will obtain CT pancreas. May consider GES if NL. 7/2023 abd US unrevealing. 8/2023 EGD unrevealing. Constipation related?  Constipation- will start on MiraLax. Upper GI symptoms likely to improve with regular BM.   Hx colon polyps- colonoscopy due.    Recommendations:    Schedule colonoscopy  Schedule MRI scan.  Begin taking MiraLAX. Start with 1/2 capful daily, and titrate dose up or down until you are having daily, soft bowel movements.      Risks, benefits, and alternatives of medical management, any associated procedures, and/or treatment discussed with the patient. Patient given opportunity to ask questions and voices understanding. Patient has elected to proceed with the recommended care modalities as discussed.    Instructed patient to notify my office if they have not been contacted within two weeks after any procedures, submitting any samples (biopsies, blood, stool, urine, etc.) or after any imaging (X-ray, CT, MRI, etc.).     Follow up in about 6 months (around 1/9/2025).    Order summary:  Orders Placed This Encounter   Procedures    MRI Abdomen W WO Contrast    Ambulatory Referral to External Surgery      This assessment, plan, and documentation was performed in collaboration with Asia Yan NP.     This document may have been created using a voice recognition transcribing system. Incorrect words or phrases may have been missed during proofreading. Please interpret accordingly or contact me for clarification.     Maricruz Gould MD

## 2024-07-09 ENCOUNTER — OFFICE VISIT (OUTPATIENT)
Dept: GASTROENTEROLOGY | Facility: CLINIC | Age: 64
End: 2024-07-09
Payer: MEDICARE

## 2024-07-09 ENCOUNTER — TELEPHONE (OUTPATIENT)
Dept: GASTROENTEROLOGY | Facility: CLINIC | Age: 64
End: 2024-07-09

## 2024-07-09 VITALS
WEIGHT: 240 LBS | DIASTOLIC BLOOD PRESSURE: 72 MMHG | HEART RATE: 92 BPM | RESPIRATION RATE: 18 BRPM | BODY MASS INDEX: 39.99 KG/M2 | SYSTOLIC BLOOD PRESSURE: 96 MMHG | TEMPERATURE: 98 F | HEIGHT: 65 IN | OXYGEN SATURATION: 94 %

## 2024-07-09 DIAGNOSIS — R13.19 ESOPHAGEAL DYSPHAGIA: ICD-10-CM

## 2024-07-09 DIAGNOSIS — R10.13 EPIGASTRIC PAIN: ICD-10-CM

## 2024-07-09 DIAGNOSIS — Z86.010 HISTORY OF COLON POLYPS: ICD-10-CM

## 2024-07-09 DIAGNOSIS — Z80.0 FAMILY HISTORY OF PANCREATIC CANCER: ICD-10-CM

## 2024-07-09 DIAGNOSIS — K21.9 GASTROESOPHAGEAL REFLUX DISEASE WITHOUT ESOPHAGITIS: Primary | ICD-10-CM

## 2024-07-09 PROCEDURE — 99215 OFFICE O/P EST HI 40 MIN: CPT | Mod: S$GLB,,, | Performed by: INTERNAL MEDICINE

## 2024-07-09 RX ORDER — SUCRALFATE 1 G/1
1 TABLET ORAL 4 TIMES DAILY
Qty: 120 TABLET | Refills: 0 | Status: SHIPPED | OUTPATIENT
Start: 2024-07-09 | End: 2024-08-08

## 2024-07-09 RX ORDER — SUCRALFATE 1 G/1
1 TABLET ORAL 4 TIMES DAILY
COMMUNITY
End: 2024-07-09 | Stop reason: SDUPTHER

## 2024-07-09 RX ORDER — PANTOPRAZOLE SODIUM 40 MG/1
40 TABLET, DELAYED RELEASE ORAL DAILY
Qty: 90 TABLET | Refills: 4 | Status: SHIPPED | OUTPATIENT
Start: 2024-07-09

## 2024-07-09 RX ORDER — SOD SULF/POT CHLORIDE/MAG SULF 1.479 G
TABLET ORAL
Qty: 24 TABLET | Refills: 0 | Status: SHIPPED | OUTPATIENT
Start: 2024-07-09

## 2024-07-09 NOTE — PATIENT INSTRUCTIONS
Schedule colonoscopy.  Schedule MRI scan.  Begin taking MiraLAX. Start with 1/2 capful daily, and titrate dose up or down until you are having daily, soft bowel movements.    Please notify my office if you have not been contacted within two weeks after any procedures, submitting any samples (biopsies, blood, stool, urine, etc.) or after any imaging (X-ray, CT, MRI, etc.).

## 2024-07-09 NOTE — TELEPHONE ENCOUNTER
Patient was given instructions and they were reviewed with patient. Patient was given subtab coupon. Patient MRI order was faxed to central scheduling. No pa was required. Patients order for colonoscopy was faxed to central scheduling at [facility]. No pa required. LRA 7/9/24

## 2024-07-09 NOTE — LETTER
July 9, 2024        Odalis Delarosa MD  [address]  [address]  [place] [address]             [place] [facility]  [address] [address]  Phone: [phone]  Fax: [phone]   Patient: Tammi Horton   MR Number: [id number]   YOB: 1960   Date of Visit: 7/9/2024       Dear Dr. Delarosa:    Thank you for referring Tammi Horton to me for evaluation. Attached you will find relevant portions of my assessment and plan of care.    If you have questions, please do not hesitate to call me. I look forward to following Tammi Horton along with you.    Sincerely,      Maricruz Gould MD            CC  No Recipients    Enclosure

## 2024-08-01 ENCOUNTER — TELEPHONE (OUTPATIENT)
Dept: GASTROENTEROLOGY | Facility: CLINIC | Age: 64
End: 2024-08-01
Payer: MEDICARE

## 2024-08-01 NOTE — TELEPHONE ENCOUNTER
Patient states she has not been contacted to schedule MRI. Will re fax orders and instruct patient if she has not heard from them by tomorrow, to call our office back. BF

## 2024-08-01 NOTE — TELEPHONE ENCOUNTER
----- Message from Mela Paz sent at 8/1/2024  9:24 AM CDT -----  Contact: self  Patient is requesting a call back regarding scheduling MRI - never received a call. Please call back at [phone]

## 2024-11-21 ENCOUNTER — TELEPHONE (OUTPATIENT)
Dept: GASTROENTEROLOGY | Facility: CLINIC | Age: 64
End: 2024-11-21
Payer: MEDICARE

## 2024-11-21 VITALS — WEIGHT: 240 LBS | BODY MASS INDEX: 39.99 KG/M2 | HEIGHT: 65 IN

## 2024-11-21 DIAGNOSIS — Z86.0100 HISTORY OF COLON POLYPS: Primary | ICD-10-CM

## 2024-11-21 NOTE — TELEPHONE ENCOUNTER
Called pt and left a detailed message that I was calling as a courtesy regarding up coming Colon with NBP on 12/4/24, Wed and wanted to verify that she has her paper prep instructions and meds.  I also mentioned that [facility] will call the day before (TUES) with the arrival time, GI Lab is located on the third floor, and to pre-register anytime next week. jaison

## 2024-11-21 NOTE — TELEPHONE ENCOUNTER
"[place] [facility]  [address] [place] [address]  Phone: [phone]  Fax: [phone]    History & Physical         Provider: Dr. Maricruz Gould    Patient Name: Tammi PRESSLEY Statlacie                                                                       (age):1960  64 y.o.           Gender: female   Phone: [phone]     Referring Physician: Odalis Delarosa     Vital Signs:   Height - 5' 4.5"  Weight - 240 lb  BMI -  40.56    Plan: Colonoscopy @ [facility]    Encounter Diagnosis   Name Primary?    History of colon polyps Yes           History:      Past Medical History:   Diagnosis Date    BMI 40.0-44.9, adult     Colon polyp     Elevated glucose     GERD (gastroesophageal reflux disease)     HTN (hypertension)     LULA (stress urinary incontinence, female)       Past Surgical History:   Procedure Laterality Date     SECTION       SECTION       SECTION      CHOLECYSTECTOMY      COLONOSCOPY      ENDOSCOPY N/A     HYSTERECTOMY      for fibroids and one ovary removed for cysts    TONSILLECTOMY AND ADENOIDECTOMY      TOTAL KNEE ARTHROPLASTY Right       Medication List with Changes/Refills   Current Medications    LOSARTAN-HYDROCHLOROTHIAZIDE 50-12.5 MG (HYZAAR) 50-12.5 MG PER TABLET    Take 1 tablet by mouth once daily.    PANTOPRAZOLE (PROTONIX) 40 MG TABLET    Take 1 tablet (40 mg total) by mouth once daily.    SOD SULF-POT CHLORIDE-MAG SULF (SUTAB) 1.479-0.188- 0.225 GRAM TABLET    Take according to package instructions with indicated amount of water. No breakfast day before test. May substitute with Suprep, Clenpiq, Plenvu, Moviprep or GoLytely based on Rx plan and patient preference.      Review of patient's allergies indicates:  No Known Allergies   Family History   Problem Relation Name Age of Onset    Pancreatic cancer Mother  68    Coronary artery disease Father      Endometrial cancer Sister  52    Breast cancer Maternal Aunt      Esophageal " cancer Paternal Uncle      Stomach cancer Maternal Grandmother      Liver cancer Neg Hx      Liver disease Neg Hx      Colon cancer Neg Hx      Crohn's disease Neg Hx      Ulcerative colitis Neg Hx        Social History     Tobacco Use    Smoking status: Never    Smokeless tobacco: Never   Substance Use Topics    Alcohol use: Never    Drug use: Never        Physical Examination:     General Appearance:___________________________  HEENT: _____________________________________  Abdomen:____________________________________  Heart:________________________________________  Lungs:_______________________________________  Extremities:___________________________________  Skin:_________________________________________  Endocrine:____________________________________  Genitourinary:_________________________________  Neurological:__________________________________      Patient has been evaluated immediately prior to sedation and is medically cleared for endoscopy with IVCS as an ASA class: ______      Physician Signature: _________________________       Date: ________  Time: ________

## 2024-11-26 ENCOUNTER — TELEPHONE (OUTPATIENT)
Dept: GASTROENTEROLOGY | Facility: CLINIC | Age: 64
End: 2024-11-26
Payer: MEDICARE

## 2024-11-26 NOTE — TELEPHONE ENCOUNTER
----- Message from Paulette sent at 11/26/2024 10:01 AM CST -----  Contact: pt  Pt calling for refill for carafate and can be reached at [phone].  Pt would like filled before thanksgiving.    [facility] [id number] - [place], [place] - [address]  [address]  [place] [address]  Phone: [phone] Fax: [phone]

## 2024-11-26 NOTE — TELEPHONE ENCOUNTER
----- Message from Paulette sent at 11/26/2024 10:01 AM CST -----  Contact: pt  Pt calling for refill for carafate and can be reached at [phone].  Pt would like filled before thanksgiving.    [facility] [id number] - [place], [place] - [address]  [address]  [place] [address]  Phone: [phone] Fax: [phone]

## 2024-11-27 NOTE — TELEPHONE ENCOUNTER
DBx nl, GBx react w/o Hp, EGBx reflux, EBx reflux.    Notify patient. No signs of BE, infection, precancerous cells or Celiac disease on the upper endoscopy biopsies.  Rec EM.  NBP      
Pt informed and will come by our office to sign EM consents.    
37

## 2024-12-04 ENCOUNTER — OUTSIDE PLACE OF SERVICE (OUTPATIENT)
Dept: GASTROENTEROLOGY | Facility: CLINIC | Age: 64
End: 2024-12-04

## 2024-12-04 LAB — CRC RECOMMENDATION EXT: NORMAL

## 2024-12-13 ENCOUNTER — TELEPHONE (OUTPATIENT)
Dept: GASTROENTEROLOGY | Facility: CLINIC | Age: 64
End: 2024-12-13
Payer: MEDICARE

## 2024-12-13 NOTE — PROGRESS NOTES
5 TA, 1 hyp, repeat colonoscopy in 3 years.     Notify patient. Confirm recall tab in appointment desk is up-to-date (update if needed).  NBP

## 2025-01-09 ENCOUNTER — DOCUMENTATION ONLY (OUTPATIENT)
Dept: GASTROENTEROLOGY | Facility: CLINIC | Age: 65
End: 2025-01-09
Payer: MEDICARE

## 2025-01-14 NOTE — PROGRESS NOTES
Clinic Note    Reason for visit:  The primary encounter diagnosis was Gastroesophageal reflux disease without esophagitis. Diagnoses of RUQ abdominal pain, Esophageal dysphagia, Epigastric pain, Early satiety, and History of colon polyps were also pertinent to this visit.    PCP: Jax Ortiz       HPI:  This is a 64 y.o. female here for follow up. Patient with GERD, abdominal pain. Upper abdominal pain improved with Carafate. MRI was ordered last OV for pancreatic cancer screening put was not scheduled. She is on pantoprazole 40mg daily which controls reflux well. She continues to have RUQ pain and bloating Sucralfate helps epigastric pain but not RUQ pain. Reports having significant bloating with nausea.  C/o early satiety. She is having BM daily. NO blood in stool. She states she has been told she has worsening kidney function and is being referred to a nephrologist.    Colonoscopy 12/4/2024 diverticulosis 5 TA, 1 hyp, repeat colonoscopy in 3 years.   EGD 8/31/2023 DBx nl, GBx react w/o Hp, EGBx reflux, EBx reflux.      Abd US 4/23/2024: liver size ULN, fatty liver, PV patent, no GB, no manuel dil.      6/2021 colon with 2 TA, 1 cecal hyp, repeat in 3y.     Review of Systems   Constitutional:  Negative for fatigue, fever and unexpected weight change.   HENT:  Positive for postnasal drip. Negative for mouth sores, sore throat and trouble swallowing.    Eyes:  Negative for pain, discharge and eye dryness.   Respiratory:  Negative for apnea, cough, choking, chest tightness, shortness of breath and wheezing.    Cardiovascular:  Negative for chest pain, palpitations and leg swelling.   Gastrointestinal:  Positive for abdominal pain and reflux. Negative for abdominal distention, anal bleeding, blood in stool, change in bowel habit, constipation, diarrhea, nausea, rectal pain, vomiting and fecal incontinence.   Genitourinary:  Positive for bladder incontinence. Negative for dysuria and hematuria.   Musculoskeletal:   Positive for back pain and joint swelling. Negative for arthralgias.   Integumentary:  Negative for color change and rash.   Allergic/Immunologic: Negative for environmental allergies and food allergies.   Neurological:  Negative for seizures and headaches.   Hematological:  Negative for adenopathy. Does not bruise/bleed easily.        Past Medical History:   Diagnosis Date    BMI 40.0-44.9, adult     Colon polyp     Elevated glucose     GERD (gastroesophageal reflux disease)     HTN (hypertension)     LULA (stress urinary incontinence, female)      Past Surgical History:   Procedure Laterality Date     SECTION       SECTION       SECTION      CHOLECYSTECTOMY      COLONOSCOPY      ENDOSCOPY N/A     HYSTERECTOMY      for fibroids and one ovary removed for cysts    TONSILLECTOMY AND ADENOIDECTOMY      TOTAL KNEE ARTHROPLASTY Right      Family History   Problem Relation Name Age of Onset    Pancreatic cancer Mother  68    Coronary artery disease Father      Endometrial cancer Sister  52    Breast cancer Maternal Aunt      Esophageal cancer Paternal Uncle      Stomach cancer Maternal Grandmother      Liver cancer Neg Hx      Liver disease Neg Hx      Colon cancer Neg Hx      Crohn's disease Neg Hx      Ulcerative colitis Neg Hx       Social History     Tobacco Use    Smoking status: Never    Smokeless tobacco: Never   Substance Use Topics    Alcohol use: Never    Drug use: Never     Review of patient's allergies indicates:  No Known Allergies   Medication List with Changes/Refills   New Medications    SUCRALFATE (CARAFATE) 1 GRAM TABLET    Take 1 tablet (1 g total) by mouth 4 (four) times daily.   Current Medications    LOSARTAN-HYDROCHLOROTHIAZIDE 50-12.5 MG (HYZAAR) 50-12.5 MG PER TABLET    Take 1 tablet by mouth once daily.   Changed and/or Refilled Medications    Modified Medication Previous Medication    PANTOPRAZOLE (PROTONIX) 20 MG TABLET pantoprazole (PROTONIX) 40 MG tablet       Take  "1 tablet (20 mg total) by mouth once daily.    Take 1 tablet (40 mg total) by mouth once daily.   Discontinued Medications    SOD SULF-POT CHLORIDE-MAG SULF (SUTAB) 1.479-0.188- 0.225 GRAM TABLET    Take according to package instructions with indicated amount of water. No breakfast day before test. May substitute with Suprep, Clenpiq, Plenvu, Moviprep or GoLytely based on Rx plan and patient preference.         Vital Signs:  BP 90/67 (BP Location: Left arm, Patient Position: Sitting)   Pulse 66   Ht 5' 4.5" (1.638 m)   SpO2 (!) 93%   BMI 40.56 kg/m²        Physical Exam  Vitals reviewed.   Constitutional:       General: She is awake. She is not in acute distress.     Appearance: Normal appearance. She is well-developed. She is obese. She is not ill-appearing, toxic-appearing or diaphoretic.   HENT:      Head: Normocephalic and atraumatic.      Nose: Nose normal.      Mouth/Throat:      Mouth: Mucous membranes are moist.      Pharynx: Oropharynx is clear. No oropharyngeal exudate or posterior oropharyngeal erythema.   Eyes:      General: Lids are normal. Gaze aligned appropriately. No scleral icterus.        Right eye: No discharge.         Left eye: No discharge.      Conjunctiva/sclera: Conjunctivae normal.   Neck:      Trachea: Trachea normal.   Cardiovascular:      Rate and Rhythm: Normal rate and regular rhythm.      Pulses:           Radial pulses are 2+ on the right side and 2+ on the left side.   Pulmonary:      Effort: Pulmonary effort is normal. No respiratory distress.      Breath sounds: No stridor. No wheezing.   Chest:      Chest wall: No tenderness.   Abdominal:      General: Bowel sounds are normal. There is no distension.      Palpations: Abdomen is soft. There is no fluid wave, hepatomegaly or mass.      Tenderness: There is no abdominal tenderness. There is no guarding or rebound.   Musculoskeletal:         General: No tenderness or deformity.      Cervical back: No tenderness.      Right lower " leg: No edema.      Left lower leg: No edema.   Lymphadenopathy:      Cervical: No cervical adenopathy.   Skin:     General: Skin is warm and dry.      Capillary Refill: Capillary refill takes less than 2 seconds.      Coloration: Skin is not cyanotic, jaundiced or pale.   Neurological:      General: No focal deficit present.      Mental Status: She is alert and oriented to person, place, and time.      Motor: No tremor.   Psychiatric:         Attention and Perception: Attention normal.         Mood and Affect: Mood and affect normal.         Speech: Speech normal.         Behavior: Behavior normal. Behavior is cooperative.            All of the data above and below has been reviewed by myself and any further interpretations will be reflected in the assessment and plan.   The data includes review of external notes, and independent interpretation of lab results, procedures, x-rays, and imaging reports.      Assessment:  Gastroesophageal reflux disease without esophagitis  -     pantoprazole (PROTONIX) 20 MG tablet; Take 1 tablet (20 mg total) by mouth once daily.  Dispense: 90 tablet; Refill: 3  -     sucralfate (CARAFATE) 1 gram tablet; Take 1 tablet (1 g total) by mouth 4 (four) times daily.  Dispense: 40 tablet; Refill: 2    RUQ abdominal pain    Esophageal dysphagia  -     pantoprazole (PROTONIX) 20 MG tablet; Take 1 tablet (20 mg total) by mouth once daily.  Dispense: 90 tablet; Refill: 3    Epigastric pain  -     NM Gastric Emptying; Future; Expected date: 01/15/2025    Early satiety  -     NM Gastric Emptying; Future; Expected date: 01/15/2025    History of colon polyps      GERD- controlled on pantoprazole. Concerns with side effects due to worsening kidney function. Will decrease to 20mg and discussed if doing well can take QOD.  Abdominal pain/bloating/nausea- will obtain GES to r/o gastroparesis. Consider sucrase breath test if normal.  Colonoscopy due 2027     Recommendations:    Schedule gastric emptying  study.  Will decrease pantoprazole to 20mg daily. If doing well can take every other day.    Risks, benefits, and alternatives of medical management, any associated procedures, and/or treatment discussed with the patient. Patient given opportunity to ask questions and voices understanding. Patient has elected to proceed with the recommended care modalities as discussed.    Follow up in about 1 year (around 1/15/2026).    Order summary:  Orders Placed This Encounter   Procedures    NM Gastric Emptying        Instructed patient to notify my office if they have not been contacted within two weeks after any procedures, submitting any samples (biopsies, blood, stool, urine, etc.) or after any imaging (X-ray, CT, MRI, etc.).      Asia Yan NP    This document may have been created using a voice recognition transcribing system. Incorrect words or phrases may have been missed during proofreading. Please interpret accordingly or contact me for clarification.

## 2025-01-15 ENCOUNTER — OFFICE VISIT (OUTPATIENT)
Dept: GASTROENTEROLOGY | Facility: CLINIC | Age: 65
End: 2025-01-15
Payer: MEDICARE

## 2025-01-15 ENCOUNTER — TELEPHONE (OUTPATIENT)
Dept: GASTROENTEROLOGY | Facility: CLINIC | Age: 65
End: 2025-01-15

## 2025-01-15 VITALS
HEIGHT: 65 IN | OXYGEN SATURATION: 93 % | BODY MASS INDEX: 40.56 KG/M2 | DIASTOLIC BLOOD PRESSURE: 67 MMHG | SYSTOLIC BLOOD PRESSURE: 90 MMHG | HEART RATE: 66 BPM

## 2025-01-15 DIAGNOSIS — R13.19 ESOPHAGEAL DYSPHAGIA: ICD-10-CM

## 2025-01-15 DIAGNOSIS — R10.11 RUQ ABDOMINAL PAIN: ICD-10-CM

## 2025-01-15 DIAGNOSIS — R10.13 EPIGASTRIC PAIN: ICD-10-CM

## 2025-01-15 DIAGNOSIS — R68.81 EARLY SATIETY: ICD-10-CM

## 2025-01-15 DIAGNOSIS — Z86.0100 HISTORY OF COLON POLYPS: ICD-10-CM

## 2025-01-15 DIAGNOSIS — K21.9 GASTROESOPHAGEAL REFLUX DISEASE WITHOUT ESOPHAGITIS: Primary | ICD-10-CM

## 2025-01-15 PROCEDURE — 99214 OFFICE O/P EST MOD 30 MIN: CPT | Mod: S$PBB,,, | Performed by: NURSE PRACTITIONER

## 2025-01-15 RX ORDER — PANTOPRAZOLE SODIUM 20 MG/1
20 TABLET, DELAYED RELEASE ORAL DAILY
Qty: 90 TABLET | Refills: 3 | Status: SHIPPED | OUTPATIENT
Start: 2025-01-15 | End: 2026-01-15

## 2025-01-15 RX ORDER — SUCRALFATE 1 G/1
1 TABLET ORAL 4 TIMES DAILY
Qty: 40 TABLET | Refills: 2 | Status: SHIPPED | OUTPATIENT
Start: 2025-01-15

## 2025-01-15 NOTE — PATIENT INSTRUCTIONS
Schedule gastric emptying study.  Will decrease pantoprazole to 20mg daily. If doing well can take every other day.    Please notify my office if you have not been contacted within two weeks after any procedures, submitting any samples (biopsies, blood, stool, urine, etc.) or after any imaging (X-ray, CT, MRI, etc.).

## 2025-01-30 ENCOUNTER — TELEPHONE (OUTPATIENT)
Dept: GASTROENTEROLOGY | Facility: CLINIC | Age: 65
End: 2025-01-30

## 2025-01-30 NOTE — TELEPHONE ENCOUNTER
1/29/2025 GES- normal    Call with results. Her gastric emptying test was normal. Is she feeling Ok with decreased dose (20mg) of pantoprazole? If still with continued abdominal pain, bloating, would recommend sucrase breath test.  KN

## 2025-01-30 NOTE — TELEPHONE ENCOUNTER
Called and spoke with patient. She said the decreased dose of panto isn't working and she's still with abdominal pain and bloating. Before I could say anything about the sucrase breath test, patient said to just have Asia call her. DMP

## 2025-08-28 ENCOUNTER — TELEPHONE (OUTPATIENT)
Dept: OBSTETRICS AND GYNECOLOGY | Facility: CLINIC | Age: 65
End: 2025-08-28
Payer: MEDICARE